# Patient Record
(demographics unavailable — no encounter records)

---

## 2024-10-14 NOTE — REASON FOR VISIT
[[employer] ] : provided by [employer]   [Interpreters_IDNumber] : [id number] [Interpreters_FullName] : [email or web address]

## 2024-10-14 NOTE — CARDIOLOGY SUMMARY
[de-identified] : 10/14/24 sinus bradycardia  [de-identified] : 5/16/22  4.6 METS  97% MPHR  No significant ST changes ( she was not able to keep up with treadmill speed )  [de-identified] : 5/16/22  Normal EF 60% Mild DD , mild MR , Mild to  moderate TR RVSp 36 mm hg

## 2024-10-14 NOTE — HISTORY OF PRESENT ILLNESS
[FreeTextEntry1] : Patient  with of HTN GERD came for follow up after changing medication , patient is feeling fine , , patient denies any chest pain or shortness of breath or dizziness or palpitation , her blood pressure is controlled ,  denies any dizziness ,   patient  had echo showed normal LVEF with mild DD , mild MR , mild to moderate TR , patient did have normal stress ekg at low work load , as she could not keep up with speed ,     Patient blood work in may 2024   MBV020, HDL64   VLu65n1.1   ( 5.8 in  dec 2023 )   thyroid ultrasound had  multinodular goiter  stable on ultrasound in dec 2023

## 2024-10-14 NOTE — DISCUSSION/SUMMARY
[FreeTextEntry1] : 74 year old female with above hx  HTN   improved controlled  , advised the patient to follow low salt diet , ,continue losartan  AM , continue coreg 6.25 mg po BID daily ,  will  continue amlodipine 5 mg po daily  PM , follow up 2 weeks   hx of HLD , controlled on diet restriction , elevated LDL , high normal cholesterol  Mild mitral regurgitation , Mild to moderate TR : will need periodic echoes to monitor severity , will review echo done today   throat discomfort mostly due to GERD with risk factors for CAD normal stress test improved , on avoiding certain food ,  explained to patient via    [EKG obtained to assist in diagnosis and management of assessed problem(s)] : EKG obtained to assist in diagnosis and management of assessed problem(s)

## 2025-01-02 NOTE — ASSESSMENT
[FreeTextEntry1] : #NUB - R frontal scalp r/o blue nevus v. SK v. atypical pigmented lesion Shave Biopsy: All side effects including pain, bleeding, infection, scar, recurrence, nerve damage were discussed and consent was obtained.   We anesthetized the area with 1% lidocaine/epinephrine.   The lesion was biopsied with a Dermablade.   Hemostasis was achieved with Drysol.   Wound care were provided and the tissue was submitted to pathology for evaluation.

## 2025-01-02 NOTE — PHYSICAL EXAM
[Alert] : alert [Oriented x 3] : ~L oriented x 3 [Well Nourished] : well nourished [Conjunctiva Non-injected] : conjunctiva non-injected [No Visual Lymphadenopathy] : no visual  lymphadenopathy [No Clubbing] : no clubbing [No Edema] : no edema [No Bromhidrosis] : no bromhidrosis [No Chromhidrosis] : no chromhidrosis [FreeTextEntry3] : Dark blue to brown papule on the R frontal scalp

## 2025-01-02 NOTE — HISTORY OF PRESENT ILLNESS
[FreeTextEntry1] : growth on the scalp [de-identified] : 74F with no personal hx of skin cancer here for growth on the R frontal scalp x 2 years. Developed after she had ?trauma to the area. Growing slightly. Asymptomatic  Hx obtaining using

## 2025-02-07 NOTE — PHYSICAL EXAM
[General Appearance - Alert] : alert [General Appearance - In No Acute Distress] : in no acute distress [General Appearance - Well Nourished] : well nourished [General Appearance - Well Developed] : well developed [Sclera] : the sclera and conjunctiva were normal [PERRL With Normal Accommodation] : pupils were equal in size, round, and reactive to light [Outer Ear] : the ears and nose were normal in appearance [Neck Appearance] : the appearance of the neck was normal [] : the neck was supple [Auscultation Breath Sounds / Voice Sounds] : lungs were clear to auscultation bilaterally [Heart Rate And Rhythm] : heart rate was normal and rhythm regular [Heart Sounds] : normal S1 and S2 [Edema] : there was no peripheral edema [Bowel Sounds] : normal bowel sounds [Abdomen Soft] : soft

## 2025-02-07 NOTE — CONSULT LETTER
[Courtesy Letter:] : I had the pleasure of seeing your patient, [unfilled], in my office today. [Please see my note below.] : Please see my note below. [Consult Closing:] : Thank you very much for allowing me to participate in the care of this patient.  If you have any questions, please do not hesitate to contact me. [Sincerely,] : Sincerely, [DrMaximiliano  ___] : Dr. MAS [DrMaximiliano ___] : Dr. MAS

## 2025-02-07 NOTE — ASSESSMENT
[FreeTextEntry1] : 75 yo woman with HTN x 20 years with periods of suboptimal control with acute MINI.  --MINI : resolved -- C/w mild CKD.   BUN/CREAT :  27/1.10 ( 1/31/2025)   28/1.07 ( 4/11/2024),  24/1.09 ( 10/17/2023),  23/1.48 (7/28/2023), 27/1.02 ( 1/4/2023)     Again explained dx of CKD  based on creat of 1.02 on 1/2023  due to HTN nephrosclerosis.  --HTN : BP control  improved and stable.  --Anemia : Check iron storage, etc and GI evaluation.   H/H : 10/32.5 ( 1/31/2025)  12.7/40.1 ( 4/11/2024)  11/34.9 ( 10/17/2023)   --Renal sonogram :10/11/2023    R 10.5 cm    L 10.2 cm      bilateral increased cortical echogenicity.  --UA : No protein/blood.

## 2025-02-07 NOTE — HISTORY OF PRESENT ILLNESS
[FreeTextEntry1] : Ms eZlaya presents for follow up of MINI/CKD  Translation via MA in office Treated for UTI in December.  Now feeling better. Noted for anemia with positive stool heme. Referred for GI evaluation per Dr Whitehead.

## 2025-02-10 NOTE — HISTORY OF PRESENT ILLNESS
[FreeTextEntry1] : Patient  with of HTN GERD came for follow up after changing medication , patient is feeling fine ,  had echo without significant change from prior echo ,    patient denies any chest pain or shortness of breath or dizziness or palpitation , her blood pressure is controlled ,  denies any dizziness ,  her home blood pressure is normal   patient  had echo showed normal LVEF with mild DD , mild MR , mild to moderate TR , patient did have normal stress ekg at low work load , as she could not keep up with speed ,   Patient blood work in may 2024   UIO179, HDL64   KSr16n0.1   ( 5.8 in  dec 2023 ) has anemia , now taking iron , her fatigue   thyroid ultrasound had  multinodular goiter  stable on ultrasound in dec 2023

## 2025-02-10 NOTE — DISCUSSION/SUMMARY
[FreeTextEntry1] : 74 year old female with above hx  HTN   improved controlled  , advised the patient to follow low salt diet , ,continue losartan  AM , continue coreg 6.25 mg po BID daily ,  will  continue amlodipine 5 mg po daily  PM ,   hx of HLD , controlled on diet restriction, elevated LDL , high normal cholesterol  Mild mitral regurgitation, Mild to moderate TR : will need periodic echoes to monitor severity ,  no significant change from prior   throat discomfort mostly due to GERD with risk factors for CAD normal stress test improved, on avoiding certain food , will give pepsid 20 mg po daily   CKD with chronic anemia : taking medication ,  being monitored by nephrology   explained to patient via    [EKG obtained to assist in diagnosis and management of assessed problem(s)] : EKG obtained to assist in diagnosis and management of assessed problem(s)

## 2025-02-10 NOTE — CARDIOLOGY SUMMARY
[de-identified] : 2/10/25 sinus bradycardia  [de-identified] : 5/16/22  4.6 METS  97% MPHR  No significant ST changes ( she was not able to keep up with treadmill speed )  [de-identified] : 10/14/24  Normal EF 60% Mild DD , mild MR , Mild to  moderate TR RVSp 37 mm hg  no significant change from prior 2022 echo

## 2025-02-10 NOTE — REASON FOR VISIT
[Symptom and Test Evaluation] : symptom and test evaluation [Arrhythmia/ECG Abnorrmalities] : arrhythmia/ECG abnormalities [Hypertension] : hypertension [[employer] ] : provided by [employer]   [Time Spent: ____ minutes] : Total time spent using  services: [unfilled] minutes. The patient's primary language is not English thus required  services. [Interpreters_IDNumber] : [id number] [Interpreters_FullName] : [email or web address]

## 2025-04-21 NOTE — HISTORY OF PRESENT ILLNESS
[FreeTextEntry1] : Mohs surgery for a nodular pigmented BCC on the right frontal scalp [de-identified] : 04/21/2025  Referred by: Dr. Grijalva  Ms. CARLY BERUMEN is a 74 year old Brazilian-speaking F who presents for Mohs surgery for a nodular pigmented BCC on the right frontal scalp. The lesion was growing as a pigmented plaque.   Social History: Seen for consult with her granddaughter, Hannah. Here with sister Sloane and daughter Cait. She lives in [place] with her son. Brazilian-speaking  Blood thinners: None Allergies: NKDA HTN

## 2025-04-21 NOTE — PHYSICAL EXAM
[Alert] : alert [Oriented x 3] : ~L oriented x 3 [Well Nourished] : well nourished [Conjunctiva Non-injected] : conjunctiva non-injected [No Visual Lymphadenopathy] : no visual  lymphadenopathy [No Clubbing] : no clubbing [No Edema] : no edema [No Bromhidrosis] : no bromhidrosis [No Chromhidrosis] : no chromhidrosis [FreeTextEntry3] : -- Right frontal scalp with a 0.8cm pigmented plaque

## 2025-04-21 NOTE — PHYSICAL EXAM
[Alert] : alert [Oriented x 3] : ~L oriented x 3 [Well Nourished] : well nourished [Conjunctiva Non-injected] : conjunctiva non-injected [No Clubbing] : no clubbing [No Visual Lymphadenopathy] : no visual  lymphadenopathy [No Edema] : no edema [No Bromhidrosis] : no bromhidrosis [No Chromhidrosis] : no chromhidrosis [FreeTextEntry3] : -- Right frontal scalp with a 0.8cm pigmented plaque

## 2025-04-21 NOTE — HISTORY OF PRESENT ILLNESS
[FreeTextEntry1] : Mohs surgery for a nodular pigmented BCC on the right frontal scalp [de-identified] : 04/21/2025  Referred by: Dr. Grijalva  Ms. CARLY BERUMEN is a 74 year old Mauritian-speaking F who presents for Mohs surgery for a nodular pigmented BCC on the right frontal scalp. The lesion was growing as a pigmented plaque.   Social History: Seen for consult with her granddaughter, Hannah. Here with sister Sloane and daughter Cait. She lives in [place] with her son. Mauritian-speaking  Blood thinners: None Allergies: NKDA HTN

## 2025-06-23 NOTE — PHYSICAL EXAM
[FreeTextEntry3] : Gen: well developed, well nourished, well appearing, NAD, AAOx3  Performed examining scalp, face, eyes, mouth, ears, neck, chest, back, abdomen, axilla, arms, hands, fingernails, legs, feet, toenails. - Pertinent findings include: - face, trunk, extremities with brown macules and patches - trunk with red papules - right frontal scalp with well healed scar - deferred examination of buttock, groin, genitalia - Female medical assistant as chaperone

## 2025-06-23 NOTE — HISTORY OF PRESENT ILLNESS
[FreeTextEntry1] : skin check [de-identified] : 75 year F h/o bcc s/p mohs 4/2025 presents for skin check. no new concerning spots   [id number] Steve

## 2025-06-23 NOTE — ASSESSMENT
[FreeTextEntry1] : #benign appearing nevi #lentigines #history of bcc s/p mohs 4/2025 right frontal scalp #skin cancer screening - none concerning for malignancy on exam today - fbse q6 months - Sun protection was discussed. The proper use of broad-spectrum UVB/UVA sunscreens with SPF 30 or greater was reviewed and the need for re-application after swimming or sweating or 3-4 hours was emphasized. We talked about judicious use of clothing and avoidance of peak periods of sun exposure. I made the patient aware of the need for year-round protection. ABCDEs of melanoma were also reviewed. - Self-skin checks reviewed  #cherry angiomas - benign, reassurance  RTC 6 months

## 2025-07-16 NOTE — ASSESSMENT
[FreeTextEntry1] : #BCC R frontal scalp s/p Mohs 4/2025 Healed well, NER c/w FBSE q6 months, had one last month at [facility] with Dr Valle  RTC 6 months

## 2025-07-16 NOTE — HISTORY OF PRESENT ILLNESS
[FreeTextEntry1] : RP BCC [de-identified] : 75 year F h/o bcc s/p mohs 4/2025  had skin check last month reports here for check of BCC area on scalp   Jose [id number]